# Patient Record
(demographics unavailable — no encounter records)

---

## 2025-02-21 NOTE — HISTORY OF PRESENT ILLNESS
[de-identified] :   This is a 51 year year old morbidly obese patient with a BMI of 32. She has tried numerous diet and exercise attempts as contained hereafter. Patient has been obese for several years, has joint pain. Has history of open gastric bypass in 2004, initial weight was 250, lowest weight 175, now able to eat and drink more.

## 2025-02-21 NOTE — PLAN
[FreeTextEntry1] : Plan upper endoscopy to evaluate for revision options. Consider endoscopic bypass revision given history of open gastric bypass. Routine workup towards bariatric surgery. May consider MWL if anatomy not amenable to revision.  I feel that this patient would benefit from a procedure for morbid obesity; This patient meets the NIH requirement for bariatric surgery. I have explained the risks and benefits of all the bariatric procedures to them.  They have had an opportunity to ask questions. They are ready to proceed with her decision to have a revision of gastric bypass.  They are to complete pre-operative medical clearance, pre-operative lab work, and pre-operative psychological evaluation. Once these have been completed, they will return to the office for final evaluation of preoperative work up, and to be scheduled for surgery.   The following options were discussed in detail with the patient: Surgical and non-surgical options discussed including:    Non-surgical weight loss    Gastric bypass     Adjustable gastric banding    Sleeve Gastrectomy Discussed advantages and disadvantages of each surgical procedure Discussed risk of surgery, including:      Death (~1%)      Leak and peritonitis (~2%)      Injury to internal organs including: esophagus, stomach, small intestine, colon, spleen (& possible splenectomy), liver, pancreas. Discussed general risks, including:      MI      CVA      DVT/PE      Bleeding      Infection      Obstruction/Adhesions/Internal Hernia      Incisional Infection/Hernia/Dehiscence   Pt. understands that NOT ALL excess weight will be lost; only 50-75% is expected for Gastric Bypass & Sleeve Gastrectomy; 40% for Adjustable gastric banding Pt. demonstrates understanding of tool concept Discussed diet & exercise as essential to postop regimen Pt. appears to understand need for lifelong follow-up Pt. appears to understand need for lifelong nutritional supplementation with vitamins/minerals/protein Pt. appears to understand that failure to comply with this regimen may result in long-term severe nutri-tional complications up to and including death. I have reviewed the patient's diet program in detail.  We have reviewed weight change in last month and discussed importance of protein, carbohydrate and fat as components of the patient's diet.  We discussed the importance of an exercise program that incorporates strength training and cardiovascular exercise.  We will see the patient again as scheduled.   Pt. wishes to proceed with the plan as outlined above.

## 2025-03-04 NOTE — ASSESSMENT
[FreeTextEntry1] : #Pulmonary risk stratification for bariatric reconstruction surgery #Mild obstructive sleep apnea, on CPAP #Dyspnea on exertion  Will obtain CPAP usage data records from [employer] Obtain PFT with pre and postbronchodilator response Obtain chest x-ray Will be able to complete pulmonary risk stratification after obtaining usage data & PFT/CXR info  RTC 1-2 weeks

## 2025-03-04 NOTE — ASSESSMENT
[FreeTextEntry1] : #Pulmonary risk stratification for bariatric reconstruction surgery #Mild obstructive sleep apnea, on CPAP #Dyspnea on exertion  Will obtain CPAP usage data records from [employer] Obtain PFT with pre and postbronchodilator response Obtain chest x-ray Will be able to complete pulmonary risk stratification after obtaining usage data & PFT/CXR info  RTC 1-2 weeks

## 2025-03-04 NOTE — HISTORY OF PRESENT ILLNESS
[Never] : never [TextBox_4] : Patient is a 51-year-old female with history of bariatric surgery, requiring reconstruction procedure, here for pulmonary risk stratification. PMHx: back issues, no DM, no HTN, HLD  Patient recently had a sleep study done and was found to have an AHI of 7.6/h per the CMS criteria.  She was started on CPAP about 2 to 3 weeks ago.  Per the patient, she has been using the CPAP throughout the night.  Initially she had trouble with the pressure setting but now is slowly getting adjusted to it.  Her daytime somnolence has been improving.  Complains of dyspnea on exertion, denies any cough, no chest pain. No wheezing No fever or chills No weight loss  Per the patient, the CPAP usage and pressure settings is being monitored by [employer].  She is due for bariatric surgery reconstruction for which she needs pulmonary risk stratification.  Denies any history of asthma, not on any inhalers. No recent hospitalization for breathing issues  Family Hx: DM

## 2025-03-04 NOTE — HISTORY OF PRESENT ILLNESS
[Never] : never [TextBox_4] : Patient is a 51-year-old female with history of bariatric surgery, requiring reconstruction procedure, here for pulmonary risk stratification. PMHx: back issues, no DM, no HTN, HLD  Patient recently had a sleep study done and was found to have an AHI of 7.6/h per the CMS criteria.  She was started on CPAP about 2 to 3 weeks ago.  Per the patient, she has been using the CPAP throughout the night.  Initially she had trouble with the pressure setting but now is slowly getting adjusted to it.  Her daytime somnolence has been improving.  Complains of dyspnea on exertion, denies any cough, no chest pain. No wheezing No fever or chills No weight loss  Per the patient, the CPAP usage and pressure settings is being monitored by [employer].  She is due for bariatric surgery reconstruction for which she needs pulmonary risk stratification.  Denies any history of asthma, not on any inhalers. No recent hospitalization for breathing issues  Family Hx: DM

## 2025-03-17 NOTE — HISTORY OF PRESENT ILLNESS
[Never] : never [TextBox_4] : Patient is a 51-year-old female with history of bariatric surgery, requiring reconstruction procedure, here for pulmonary risk stratification. PMHx: back issues, no DM, no HTN, HLD  Patient recently had a sleep study done and was found to have an AHI of 7.6/h per the CMS criteria.  She was started on CPAP about 2 to 3 weeks ago.  Per the patient, she has been using the CPAP throughout the night.  Initially she had trouble with the pressure setting but now is slowly getting adjusted to it.  Her daytime somnolence has been improving.  Complains of dyspnea on exertion, denies any cough, no chest pain. No wheezing No fever or chills No weight loss  Per the patient, the CPAP usage and pressure settings is being monitored by [employer].  She is due for bariatric surgery reconstruction for which she needs pulmonary risk stratification.  Denies any history of asthma, not on any inhalers. No recent hospitalization for breathing issues  Family Hx: DM  3/17/2025 Patient has trouble with using CPAP. She initially had nasal pillows, now full face mask. Full face mask works better for her but in a couple of hours after wearing the CPAP at night, she will move and the mask leak will occur, at times the mask comes off as well She has fragmented sleep, partly due to family obligations with kids and majority of sleep happens during the daytime

## 2025-03-17 NOTE — HISTORY OF PRESENT ILLNESS
[Never] : never [TextBox_4] : Patient is a 51-year-old female with history of bariatric surgery, requiring reconstruction procedure, here for pulmonary risk stratification. PMHx: back issues, no DM, no HTN, HLD  Patient recently had a sleep study done and was found to have an AHI of 7.6/h per the CMS criteria.  She was started on CPAP about 2 to 3 weeks ago.  Per the patient, she has been using the CPAP throughout the night.  Initially she had trouble with the pressure setting but now is slowly getting adjusted to it.  Her daytime somnolence has been improving.  Complains of dyspnea on exertion, denies any cough, no chest pain. No wheezing No fever or chills No weight loss  Per the patient, the CPAP usage and pressure settings is being monitored by [employer].  She is due for bariatric surgery reconstruction for which she needs pulmonary risk stratification.  Denies any history of asthma, not on any inhalers. No recent hospitalization for breathing issues  Family Hx: DM  3/17/2025 Patient has trouble with using CPAP. She initially had nasal pillows, now full face mask. Full face mask works better for her but in a couple of hours after wearing the CPAP at night, she will move and the mask leak will occur, at times the mask comes off as well She has fragmented sleep, partly due to family obligations with kids and majority of sleep happens during the daytime

## 2025-03-17 NOTE — PROCEDURE
[FreeTextEntry1] : PFTs 3/11/2025 FEV1 2.19 L, 101% FVC 3.56 L 98% FEV1/FVC 82 No large airway obstruction, no bronchodilator response TLC 93%, no restriction DLCO 85%, normal diffusion capacity PFTs normal  6-minute walk distance Patient walked 457m, O2 sat 94% at the end of the test, Lambert scale 0.   2/19/2025 to 3/6/2025 CPAP usage data: Total CPAP usage 81%, 13/16 days, greater than 4 hours use 31%, average usage on days used 3 hours and 38 minutes, AHI 14/hr  CPAP usage data Patient used the CPAP device 12 out of 30 days from 2/3/2025 to 3/4/2025. With 7 days usage less than 4 hours and 5 days usage more than 4 hours and average usage total days 1 hour and 31 minutes. AHI 11.8/hr  HSAT AHI 7.6/hr, O2 corine 85% <88% for 1.8 min during sleep study

## 2025-03-17 NOTE — ASSESSMENT
[FreeTextEntry1] : #Pulmonary risk stratification for bariatric reconstruction surgery #Mild obstructive sleep apnea, on CPAP #Dyspnea on exertion  AutoPAP 4-8cwp, patient trying to adjust to the CPAP usage.  Mask fitting trial next week with the DME company PFTs normal Chest Xray clear Sleep hygiene counselled, advised to slowly adapt to sleeping more hours at night, per patient there are family obligations and challeneges that precludes this Also advised to use CPAP during the day as well which is her majority of the sleep time Will obtain another CPAP usage data in 2 weeks, which should reflect these adjustments weight loss and dietary changes would also help improve sleep apnea  Patient is at intermediate risk for periprocedural pulmonary complications, most risks are due to the nature of the procedure and upper abdominal surgical incision. ARISCAT score 13.3% risk of in-hospital post-op pulmonary complications (composite including respiratory failure, respiratory infection, pleural effusion, atelectasis, pneumothorax, bronchospasm, aspiration pneumonitis) -Maintain Aspiration precautions -Would recommend minimizing sedating medications use -extubate to NIV when completely awake   RTC 2 weeks telehealth to review usage data prior to procedure

## 2025-03-23 NOTE — PHYSICAL EXAM
[Well Developed] : well developed [Well Nourished] : well nourished [Normal Conjunctiva] : normal conjunctiva [Normal Venous Pressure] : normal venous pressure [Normal S1, S2] : normal S1, S2 [No Murmur] : no murmur [Clear Lung Fields] : clear lung fields [Good Air Entry] : good air entry [No Respiratory Distress] : no respiratory distress  [Soft] : abdomen soft [Non Tender] : non-tender [Normal Gait] : normal gait [No Edema] : no edema

## 2025-03-23 NOTE — HISTORY OF PRESENT ILLNESS
[FreeTextEntry1] : Ms. Mora is a 51F with history of obesity with gastric bypass surgery, progressively worsening weight gain and presenting to establish care and preop for endoscopy and potential revision.  Reports weight gain progressively over last 10 years, initially had dropped from 260 to 175 after initial surgery. Seen by bariatric team with plan for Endoscopy on April 4th.   Previous cardiac work up was preop for spinal surgery in [id number]. Stress tests previously normal that she reports.   Denies chest pain shortness, 30 min on treadmill and bike, walk up flight of stairs without exertional symptoms, some knee issues.  Reports if low BP has some dizziness. No syncopal episodes of LOC. No palpitations.    Meds: prilozec, pregabalin, progesterone Surgical: Gallbladder removal, 4x csection, gastric bypass 2004 Fhx: Mother CM, Maternal grand mother PCIs  Labs:  Tchol 227  TSH 4.85 (No reflex T4)

## 2025-03-23 NOTE — REASON FOR VISIT
[FreeTextEntry1] :    Endoscopy on April 4th  nancy, pregabalin, progesterone,   Gallbladder removal, 4x csection, gastric bypass 2004

## 2025-03-23 NOTE — REVIEW OF SYSTEMS
[Negative] : Neurological [SOB] : no shortness of breath [Dyspnea on exertion] : not dyspnea during exertion [Palpitations] : no palpitations [Joint Pain] : no joint pain [Joint Swelling] : no joint swelling

## 2025-03-23 NOTE — ASSESSMENT
[FreeTextEntry1] : Ms. Mora is a 51F with history of obesity with gastric bypass surgery, progressively worsening weight gain and presenting to establish care and preop for endoscopy and potential revision.   1) Preoperative evaluation Planned for endoscopy on April 4th.  EKG No ischemic changes, sinus rhythm. Currently euvolemic. METS >4.  Patient is low/intermediate risk for low risk procedure and may proceed with endoscopy.  - I have ordered an echocardiogram for further risk optimization especially as she may likely have additional procedures for bypass revision.  - LDL has been elevated. I have initiated a statin and may continue statin through perioperative period.   2) Hyperlipidemia , initiate low dose statin at this time  3) TSH abnormalities Has had issues with thyroid in the past but not on any medications. Given significant weight gain will resend TSH with reflex T4, may need reinitiation of medications.   4) Obesity evaluation for bypass revision as above. Discussed lifestyle modifications.  - Labs as above given hx of thyroid issues  RTC post endoscopy or sooner as clinically indicated

## 2025-03-23 NOTE — HISTORY OF PRESENT ILLNESS
[FreeTextEntry1] : Ms. Mora is a 51F with history of obesity with gastric bypass surgery, progressively worsening weight gain and presenting to establish care and preop for endoscopy and potential revision.  Reports weight gain progressively over last 10 years, initially had dropped from 260 to 175 after initial surgery. Seen by bariatric team with plan for Endoscopy on April 4th.   Previous cardiac work up was preop for spinal surgery in [id number]. Stress tests previously normal that she reports.   Denies chest pain shortness, 30 min on treadmill and bike, walk up flight of stairs without exertional symptoms, some knee issues.  Reports if low BP has some dizziness. No syncopal episodes of LOC. No palpitations.    Meds: prilozec, pregabalin, progesterone Surgical: Gallbladder removal, 4x csection, gastric bypass 2004 Fhx: Mother CM, Maternal grand mother PCIs  Labs:  Tchol 227  TSH 4.85 (No reflex T4)

## 2025-04-01 NOTE — HISTORY OF PRESENT ILLNESS
[Never] : never [TextBox_4] : Patient is a 51-year-old female with history of bariatric surgery, requiring reconstruction procedure, here for pulmonary risk stratification. PMHx: back issues, no DM, no HTN, HLD  Patient recently had a sleep study done and was found to have an AHI of 7.6/h per the CMS criteria.  She was started on CPAP about 2 to 3 weeks ago.  Per the patient, she has been using the CPAP throughout the night.  Initially she had trouble with the pressure setting but now is slowly getting adjusted to it.  Her daytime somnolence has been improving.  Complains of dyspnea on exertion, denies any cough, no chest pain. No wheezing No fever or chills No weight loss  Per the patient, the CPAP usage and pressure settings is being monitored by [employer].  She is due for bariatric surgery reconstruction for which she needs pulmonary risk stratification.  Denies any history of asthma, not on any inhalers. No recent hospitalization for breathing issues  Family Hx: DM  3/17/2025 Patient has trouble with using CPAP. She initially had nasal pillows, now full face mask. Full face mask works better for her but in a couple of hours after wearing the CPAP at night, she will move and the mask leak will occur, at times the mask comes off as well She has fragmented sleep, partly due to family obligations with kids and majority of sleep happens during the daytime  3/28/2025 [facility] had a follow-up with the patient, changed the interface, the mask and patient has been tolerating the CPAP machine much better over the past 2 weeks.  Patient mentions that she did not use the CPAP machine over the weekend but otherwise has been using it throughout the night. She is refreshed from her sleep, and does not feel tired during the daytime or the need to take naps.

## 2025-04-01 NOTE — HISTORY OF PRESENT ILLNESS
[Never] : never [TextBox_4] : Patient is a 51-year-old female with history of bariatric surgery, requiring reconstruction procedure, here for pulmonary risk stratification. PMHx: back issues, no DM, no HTN, HLD  Patient recently had a sleep study done and was found to have an AHI of 7.6/h per the CMS criteria.  She was started on CPAP about 2 to 3 weeks ago.  Per the patient, she has been using the CPAP throughout the night.  Initially she had trouble with the pressure setting but now is slowly getting adjusted to it.  Her daytime somnolence has been improving.  Complains of dyspnea on exertion, denies any cough, no chest pain. No wheezing No fever or chills No weight loss  Per the patient, the CPAP usage and pressure settings is being monitored by [employer].  She is due for bariatric surgery reconstruction for which she needs pulmonary risk stratification.  Denies any history of asthma, not on any inhalers. No recent hospitalization for breathing issues  Family Hx: DM  3/17/2025 Patient has trouble with using CPAP. She initially had nasal pillows, now full face mask. Full face mask works better for her but in a couple of hours after wearing the CPAP at night, she will move and the mask leak will occur, at times the mask comes off as well She has fragmented sleep, partly due to family obligations with kids and majority of sleep happens during the daytime  3/28/2025 [facility] had a follow-up with the patient, changed the interface, the mask and patient has been tolerating the CPAP machine much better over the past 2 weeks.  Patient mentions that she did not use the CPAP machine over the weekend but otherwise has been using it throughout the night. She is refreshed from her sleep, and does not feel tired during the daytime or the need to take naps.

## 2025-04-01 NOTE — REASON FOR VISIT
[Home] : at home, [unfilled] , at the time of the visit. [Medical Office: ([facility])___] : at the medical office located in  [Telephone (audio)] : This telephonic visit was provided via audio only technology. [Technical] : patient unable to effectively utilize tele-video due to technical issues. [Verbal consent obtained from patient] : the patient, [unfilled] [Initial] : an initial visit [Sleep Apnea] : sleep apnea

## 2025-04-01 NOTE — REASON FOR VISIT
[Home] : at home, [unfilled] , at the time of the visit. [Medical Office: ([facility])___] : at the medical office located in  [Telephone (audio)] : This telephonic visit was provided via audio only technology. [Technical] : patient unable to effectively utilize tele-video due to technical issues. [Verbal consent obtained from patient] : the patient, [unfilled] [Initial] : an initial visit [Sleep Apnea] : sleep apnea

## 2025-04-01 NOTE — ASSESSMENT
[FreeTextEntry1] : #Mild obstructive sleep apnea, on CPAP Most recent CPAP usage data reviewed, over the past 2 weeks after mask fitting AHI is improved mostly <5/hr and patient has been using the machine consistently for greater than 4 hours.  Patient encouraged to continue to use the CPAP machine nightly, throughout the night, and use it during the daytime naps as well  #Pulmonary risk stratification for bariatric reconstruction surgery PFTs normal Chest Xray clear Patient is at intermediate risk for periprocedural pulmonary complications, most risks are due to the nature of the procedure and upper abdominal surgical incision. ARISCAT score 13.3% risk of in-hospital post-op pulmonary complications (composite including respiratory failure, respiratory infection, pleural effusion, atelectasis, pneumothorax, bronchospasm, aspiration pneumonitis) -Maintain Aspiration precautions -Would recommend minimizing sedating medications use -extubate to NIV when completely awake  No further optimization required prior to the surgery from pulmonary standpoint  Return to clinic in 3 months or sooner if need be

## 2025-04-28 NOTE — PLAN
[FreeTextEntry1] : reviewed possible side effects of Zepbound no contraindications to starting GLP1 RA  counseled patient on focusing on high protein diet- 60-80g a day of lean protein, focusing on high fiber intake- fruits and veggies counseled patient on regular exercise- to include cardio and lifting to build lean muscle counseled patient on drinking enough water throughout the day- 64oz+ advised patient to call if develops any side effects from medication or has any questions follow up in 1 month to review weight loss progress, possible side effects and to refill medication if indicated  patient to follow-up with her PCP for her elevated TSH and elevated cholesterol levels

## 2025-04-28 NOTE — PHYSICAL EXAM
[Normal] : affect appropriate [de-identified] : unlabored respirations [de-identified] : regular rate [de-identified] : soft nontender

## 2025-04-28 NOTE — ASSESSMENT
[FreeTextEntry1] : 50yo F with h/o gastric bypass  EGD reviewed- short pouch with only slightly dilated GJ, not a candidate for surgical or endoscopic revision of gastric pouch or GJ  patient should be a candidate for Zepbound with the indication of moderate obstructive sleep apnea- she has medicare and meets criteria for Zepbound therapy, will do prior auth if needed f/u monthly if starts medication

## 2025-04-28 NOTE — HISTORY OF PRESENT ILLNESS
[de-identified] : Patient had EGD done with Dr Valdes earlier this month. Here to discuss options. She also had bloodwork drawn.  She has also seen pulmonary and was started on a CPAP for her moderate VAL.

## 2025-07-02 NOTE — ASSESSMENT
[FreeTextEntry1] : #Mild obstructive sleep apnea, on CPAP Report excellent CPAP compliance Patient reports excellent CPAP compliance, uses it nightly as well as during naps in the daytime. She reports good seal with fullface mask.   Prescription sent to [facility] for resupply of the interface every 3 to 6 months. Compliance report requested. Avoid alcohol and sedatives at bedtime.  Diet, exercise and weight loss suggested, congratulated the patient on weight loss. She has started Zepbound GLP 1 agonist use  FU 1 yr or sooner if need be

## 2025-07-02 NOTE — REASON FOR VISIT
[Initial] : an initial visit [Sleep Apnea] : sleep apnea [Home] : at home, [unfilled] , at the time of the visit. [Medical Office: ([facility])___] : at the medical office located in  [Telephone (audio)] : This telephonic visit was provided via audio only technology. [Technical] : patient unable to effectively utilize tele-video due to technical issues. [Verbal consent obtained from patient] : the patient, [unfilled]

## 2025-07-02 NOTE — HISTORY OF PRESENT ILLNESS
[Never] : never [TextBox_4] : Patient is a 51-year-old female with history of bariatric surgery, requiring reconstruction procedure, here for pulmonary risk stratification. PMHx: back issues, no DM, no HTN, HLD  Patient recently had a sleep study done and was found to have an AHI of 7.6/h per the CMS criteria.  She was started on CPAP about 2 to 3 weeks ago.  Per the patient, she has been using the CPAP throughout the night.  Initially she had trouble with the pressure setting but now is slowly getting adjusted to it.  Her daytime somnolence has been improving.  Complains of dyspnea on exertion, denies any cough, no chest pain. No wheezing No fever or chills No weight loss  Per the patient, the CPAP usage and pressure settings is being monitored by "[facility] [employer] She is due for bariatric surgery reconstruction for which she needs pulmonary risk stratification.  Denies any history of asthma, not on any inhalers. No recent hospitalization for breathing issues  Family Hx: DM  3/17/2025 Patient has trouble with using CPAP. She initially had nasal pillows, now full face mask. Full face mask works better for her but in a couple of hours after wearing the CPAP at night, she will move and the mask leak will occur, at times the mask comes off as well She has fragmented sleep, partly due to family obligations with kids and majority of sleep happens during the daytime  3/28/2025 [facility] had a follow-up with the patient, changed the interface, the mask and patient has been tolerating the CPAP machine much better over the past 2 weeks.  Patient mentions that she did not use the CPAP machine over the weekend but otherwise has been using it throughout the night. She is refreshed from her sleep, and does not feel tired during the daytime or the need to take naps.  7/2025 Patient has been doing very well with his CPAP usage.  She has no new complaints.  She has been using CPAP every night and also during daytime naps.  Patient seems to be benefiting from CPAP usage and has decreased daytime somnolence, feels refreshed form sleep.  She recently was started on Zepbound, lost about 15 lbs in 3 months